# Patient Record
Sex: MALE | Race: WHITE
[De-identification: names, ages, dates, MRNs, and addresses within clinical notes are randomized per-mention and may not be internally consistent; named-entity substitution may affect disease eponyms.]

---

## 2019-11-21 ENCOUNTER — HOSPITAL ENCOUNTER (OUTPATIENT)
Dept: HOSPITAL 75 - RAD | Age: 24
End: 2019-11-21
Attending: FAMILY MEDICINE
Payer: COMMERCIAL

## 2019-11-21 DIAGNOSIS — R59.0: Primary | ICD-10-CM

## 2019-11-21 PROCEDURE — 76536 US EXAM OF HEAD AND NECK: CPT

## 2019-11-21 NOTE — DIAGNOSTIC IMAGING REPORT
INDICATION: Enlarged right cervical lymph node for several

months.



COMPARISON: No prior studies are available for comparison.



FINDINGS: Sonographic interrogation of the right neck was

performed. There is a hypoechoic mass in the right neck measuring

3.9 x 2.1 x 1.4 cm. This may represent an enlarged lymph node.

This is just lateral to the right submandibular gland. No fluid

collection is seen.



IMPRESSION: Probable right neck lymphadenopathy. Further

evaluation with CT soft tissue neck with contrast would be

recommended for better characterization.



Dictated by: 



  Dictated on workstation # [id number]

## 2019-11-27 ENCOUNTER — HOSPITAL ENCOUNTER (OUTPATIENT)
Dept: HOSPITAL 75 - RAD | Age: 24
End: 2019-11-27
Payer: COMMERCIAL

## 2019-11-27 DIAGNOSIS — R22.1: Primary | ICD-10-CM

## 2019-11-27 DIAGNOSIS — Z98.890: ICD-10-CM

## 2019-11-27 PROCEDURE — 70491 CT SOFT TISSUE NECK W/DYE: CPT

## 2019-11-27 NOTE — DIAGNOSTIC IMAGING REPORT
PROCEDURE: CT neck soft tissue with contrast.



TECHNIQUE: Multiple contiguous axial images were obtained through

the neck after the administration of contrast. Auto Exposure

Controls were utilized during the CT exam to meet ALARA standards

for radiation dose reduction. 



INDICATION:  Right neck mass.



Correlation made with ultrasound 11/21/2019.



FINDINGS: The visualized intracranial contents is unremarkable

without mass effect or abnormal enhancement.



The mastoids appear clear. The paranasal sinuses demonstrate mild

mucosal thickening in the right maxillary sinus. There appear to

be prior surgical changes of right internal ethmoidectomies.

There is no air-fluid level. The orbital contents appear

unremarkable.



The posterior nasopharynx and oropharynx appear appropriately

symmetric. There is no displacement of the paravertebral fat

planes. There are no findings of an abnormal process within the

prevertebral or retropharyngeal space. Base of the tongue

symmetric. There is no thickening of the epiglottis. The

vallecula and piriform sinuses are aerated. The vocal folds

appear symmetric.



The parotid and the submandibular glands appear unremarkable. The

thyroid is unremarkable.



There are mildly prominent bilateral level 2 cervical lymph

nodes. There also are scattered small lymph nodes demonstrated

within the posterior triangle at level 5 as well as some level 3

lymph nodes. The largest measurable lymph node is 2.0 x 1.0 cm on

the right just posterior to the right submandibular gland. There

is no correlate to the size of the reported lymph node on

previous ultrasound and the ultrasound may have been imaging

portions of the sternocleidomastoid muscle belly.



The lung apices appear clear. Vascular structures of the neck are

unremarkable. Cervical spine alignment is normal.



IMPRESSION:

1. Normal symmetry of the aerodigestive tract.

2. Small scattered bilateral level 2, 3, and 5 cervical lymph

nodes. These do not appear pathologically enlarged by CT

criteria.

3. There is no CT correlate of the measured mass or lymph node on

prior ultrasound. At the location annotated on the prior

examination posterior to the right submandibular gland, it is

possible that these measurements were measurements of a portion

of the muscle belly of the sternocleidomastoid muscle. There are

no lymph nodes evident that measure up to 3.9 cm.



Dictated by: 



  Dictated on workstation # [id number]